# Patient Record
Sex: MALE | URBAN - METROPOLITAN AREA
[De-identification: names, ages, dates, MRNs, and addresses within clinical notes are randomized per-mention and may not be internally consistent; named-entity substitution may affect disease eponyms.]

---

## 2022-01-20 ENCOUNTER — TELEPHONE ENCOUNTER (OUTPATIENT)
Dept: URBAN - METROPOLITAN AREA CLINIC 92 | Facility: CLINIC | Age: 52
End: 2022-01-20

## 2022-01-20 ENCOUNTER — LAB OUTSIDE AN ENCOUNTER (OUTPATIENT)
Dept: URBAN - METROPOLITAN AREA CLINIC 92 | Facility: CLINIC | Age: 52
End: 2022-01-20

## 2022-01-20 ENCOUNTER — OUT OF OFFICE VISIT (OUTPATIENT)
Dept: URBAN - METROPOLITAN AREA MEDICAL CENTER 9 | Facility: MEDICAL CENTER | Age: 52
End: 2022-01-20
Payer: COMMERCIAL

## 2022-01-20 DIAGNOSIS — K85.80 OTHER ACUTE PANCREATITIS WITHOUT INFECTION OR NECROSIS: ICD-10-CM

## 2022-01-20 DIAGNOSIS — R11.0 AM NAUSEA: ICD-10-CM

## 2022-01-20 DIAGNOSIS — R10.84 ABDOMINAL CRAMPING, GENERALIZED: ICD-10-CM

## 2022-01-20 PROCEDURE — G8427 DOCREV CUR MEDS BY ELIG CLIN: HCPCS | Performed by: INTERNAL MEDICINE

## 2022-01-20 PROCEDURE — 99222 1ST HOSP IP/OBS MODERATE 55: CPT | Performed by: INTERNAL MEDICINE

## 2022-01-21 ENCOUNTER — TELEPHONE ENCOUNTER (OUTPATIENT)
Dept: URBAN - METROPOLITAN AREA CLINIC 74 | Facility: CLINIC | Age: 52
End: 2022-01-21

## 2022-01-21 ENCOUNTER — OUT OF OFFICE VISIT (OUTPATIENT)
Dept: URBAN - METROPOLITAN AREA MEDICAL CENTER 9 | Facility: MEDICAL CENTER | Age: 52
End: 2022-01-21
Payer: COMMERCIAL

## 2022-01-21 DIAGNOSIS — K85.80 AUTOIMMUNE PANCREATITIS: ICD-10-CM

## 2022-01-21 DIAGNOSIS — R10.84 ABDOMINAL CRAMPING, GENERALIZED: ICD-10-CM

## 2022-01-21 DIAGNOSIS — R11.0 AM NAUSEA: ICD-10-CM

## 2022-01-21 DIAGNOSIS — R93.2 ABN FIND-BILIARY TRACT: ICD-10-CM

## 2022-01-21 PROBLEM — 253017000: Status: ACTIVE | Noted: 2022-01-20

## 2022-01-21 PROCEDURE — 99232 SBSQ HOSP IP/OBS MODERATE 35: CPT | Performed by: INTERNAL MEDICINE

## 2022-01-26 ENCOUNTER — OFFICE VISIT (OUTPATIENT)
Dept: URBAN - METROPOLITAN AREA MEDICAL CENTER 30 | Facility: MEDICAL CENTER | Age: 52
End: 2022-01-26
Payer: COMMERCIAL

## 2022-01-26 DIAGNOSIS — K83.1 BILIARY STRICTURE: ICD-10-CM

## 2022-01-26 DIAGNOSIS — K80.50 BILE DUCT CALCULUS: ICD-10-CM

## 2022-01-26 PROCEDURE — 43264 ERCP REMOVE DUCT CALCULI: CPT | Performed by: STUDENT IN AN ORGANIZED HEALTH CARE EDUCATION/TRAINING PROGRAM

## 2022-01-26 PROCEDURE — 43274 ERCP DUCT STENT PLACEMENT: CPT | Performed by: STUDENT IN AN ORGANIZED HEALTH CARE EDUCATION/TRAINING PROGRAM

## 2022-01-27 ENCOUNTER — TELEPHONE ENCOUNTER (OUTPATIENT)
Dept: URBAN - METROPOLITAN AREA CLINIC 74 | Facility: CLINIC | Age: 52
End: 2022-01-27

## 2022-01-27 ENCOUNTER — LAB OUTSIDE AN ENCOUNTER (OUTPATIENT)
Dept: URBAN - METROPOLITAN AREA MEDICAL CENTER 30 | Facility: MEDICAL CENTER | Age: 52
End: 2022-01-27

## 2022-01-27 PROBLEM — 235921003: Status: ACTIVE | Noted: 2022-01-27

## 2022-01-31 ENCOUNTER — TELEPHONE ENCOUNTER (OUTPATIENT)
Dept: URBAN - METROPOLITAN AREA CLINIC 74 | Facility: CLINIC | Age: 52
End: 2022-01-31

## 2022-02-03 ENCOUNTER — TELEPHONE ENCOUNTER (OUTPATIENT)
Dept: URBAN - METROPOLITAN AREA CLINIC 74 | Facility: CLINIC | Age: 52
End: 2022-02-03

## 2022-02-03 ENCOUNTER — TELEPHONE ENCOUNTER (OUTPATIENT)
Dept: URBAN - METROPOLITAN AREA CLINIC 40 | Facility: CLINIC | Age: 52
End: 2022-02-03

## 2022-02-06 LAB
A/G RATIO: 1.3
ALBUMIN: 4.1
ALKALINE PHOSPHATASE: 235
AST (SGOT): 53
BASO (ABSOLUTE): 0
BASOS: 0
BILIRUBIN, TOTAL: 1.1
BUN/CREATININE RATIO: 14
BUN: 28
CALCIUM: 9.8
CARBON DIOXIDE, TOTAL: 21
CHLORIDE: 99
CREATININE: 1.98
EGFR IF AFRICN AM: 43
EGFR IF NONAFRICN AM: 37
EOS (ABSOLUTE): 0.2
EOS: 2
GLOBULIN, TOTAL: 3.1
GLUCOSE: 125
HEMATOCRIT: 38.7
HEMATOLOGY COMMENTS:: (no result)
HEMOGLOBIN: 14.1
IMMATURE CELLS: (no result)
IMMATURE GRANS (ABS): 0
IMMATURE GRANULOCYTES: 0
LYMPHS (ABSOLUTE): 1.5
LYMPHS: 14
MCH: 31.6
MCHC: 36.4
MCV: 87
MONOCYTES(ABSOLUTE): 1.3
MONOCYTES: 12
NEUTROPHILS (ABSOLUTE): 7.9
NEUTROPHILS: 72
NRBC: (no result)
PLATELETS: 243
POTASSIUM: 3.9
PROTEIN, TOTAL: 7.2
RBC: 4.46
RDW: 12.8
SODIUM: 142
WBC: 11

## 2022-02-07 ENCOUNTER — TELEPHONE ENCOUNTER (OUTPATIENT)
Dept: URBAN - METROPOLITAN AREA CLINIC 92 | Facility: CLINIC | Age: 52
End: 2022-02-07

## 2022-02-07 PROBLEM — 111583006: Status: ACTIVE | Noted: 2022-02-07

## 2022-02-07 RX ORDER — CIPROFLOXACIN HYDROCHLORIDE 500 MG/1
1 TABLET TABLET, FILM COATED ORAL
Qty: 14 TABLET | Refills: 0 | OUTPATIENT
Start: 2022-02-07 | End: 2022-02-14

## 2022-02-07 RX ORDER — METRONIDAZOLE 500 MG/1
1 TABLET TABLET ORAL THREE TIMES A DAY
Qty: 21 TABLET | Refills: 0 | OUTPATIENT
Start: 2022-02-07 | End: 2022-02-14

## 2022-02-11 ENCOUNTER — OFFICE VISIT (OUTPATIENT)
Dept: URBAN - METROPOLITAN AREA MEDICAL CENTER 18 | Facility: MEDICAL CENTER | Age: 52
End: 2022-02-11
Payer: COMMERCIAL

## 2022-02-11 DIAGNOSIS — Z46.59 ENCOUNTER FOR FITTING AND ADJUSTMENT OF OTHER GASTROINTESTINAL APPLIANCE AND DEVICE: ICD-10-CM

## 2022-02-11 DIAGNOSIS — K83.8 ACQUIRED DILATION OF BILE DUCT: ICD-10-CM

## 2022-02-11 DIAGNOSIS — K80.51 CALCULUS OF BILE DUCT WITHOUT CHOLANGITIS OR CHOLECYSTITIS WITH OBSTRUCTION: ICD-10-CM

## 2022-02-11 PROCEDURE — 43261 ENDO CHOLANGIOPANCREATOGRAPH: CPT | Performed by: STUDENT IN AN ORGANIZED HEALTH CARE EDUCATION/TRAINING PROGRAM

## 2022-02-11 PROCEDURE — 43275 ERCP REMOVE FORGN BODY DUCT: CPT | Performed by: STUDENT IN AN ORGANIZED HEALTH CARE EDUCATION/TRAINING PROGRAM

## 2022-02-11 PROCEDURE — 43264 ERCP REMOVE DUCT CALCULI: CPT | Performed by: STUDENT IN AN ORGANIZED HEALTH CARE EDUCATION/TRAINING PROGRAM

## 2022-02-11 PROCEDURE — 43273 ENDOSCOPIC PANCREATOSCOPY: CPT | Performed by: STUDENT IN AN ORGANIZED HEALTH CARE EDUCATION/TRAINING PROGRAM

## 2022-02-14 ENCOUNTER — LAB OUTSIDE AN ENCOUNTER (OUTPATIENT)
Dept: URBAN - METROPOLITAN AREA CLINIC 92 | Facility: CLINIC | Age: 52
End: 2022-02-14

## 2022-02-14 ENCOUNTER — TELEPHONE ENCOUNTER (OUTPATIENT)
Dept: URBAN - METROPOLITAN AREA CLINIC 92 | Facility: CLINIC | Age: 52
End: 2022-02-14

## 2022-03-25 ENCOUNTER — OFFICE VISIT (OUTPATIENT)
Dept: URBAN - METROPOLITAN AREA MEDICAL CENTER 18 | Facility: MEDICAL CENTER | Age: 52
End: 2022-03-25

## 2022-03-28 ENCOUNTER — LAB OUTSIDE AN ENCOUNTER (OUTPATIENT)
Dept: URBAN - METROPOLITAN AREA CLINIC 74 | Facility: CLINIC | Age: 52
End: 2022-03-28

## 2022-03-28 ENCOUNTER — DASHBOARD ENCOUNTERS (OUTPATIENT)
Age: 52
End: 2022-03-28

## 2022-03-28 ENCOUNTER — OFFICE VISIT (OUTPATIENT)
Dept: URBAN - METROPOLITAN AREA CLINIC 74 | Facility: CLINIC | Age: 52
End: 2022-03-28
Payer: COMMERCIAL

## 2022-03-28 DIAGNOSIS — K80.50 CHOLEDOCHOLITHIASIS: ICD-10-CM

## 2022-03-28 PROCEDURE — 99203 OFFICE O/P NEW LOW 30 MIN: CPT | Performed by: STUDENT IN AN ORGANIZED HEALTH CARE EDUCATION/TRAINING PROGRAM

## 2022-03-28 NOTE — HPI-TODAY'S VISIT:
51-year-old male Seen in the clinic for the first time.  Initially seen in the hospital by my partner for acute biliary pancreatitis.  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  Patient bilirubin initially was high but then trended down.  Patient underwent first ERCP with stent placement across the stricture.  Patient was then plan for repeat ERCP with spyglass at [facility] where pre-existing plastic stent was removed and a spyglass examination was performed which revealed multiple stones in the form of biliary cast from hilum to left hepatic duct and to the origin of branches from left hepatic duct.  EHL lithotripsy was performed and duct was cleared from common hepatic duct, hilum, mid and distal left hepatic duct.  Significant stone burden still remained in the proximal left hepatic duct with poor visualization of biliary tree above it and inability to get a wire across it.  Patient bilirubin was normal even before the first stent placement and hence this time patient was left without stent.  Patient did well post procedure and today comes in for follow-up. Patient intermittently has some abdomen discomfort but not very bothersome.  No nausea or vomiting.  No reflux or dysphagia.  No jaundice.  No fever or chills.  Good appetite.  Denies any weight loss.  Does reports muscular abdomen pain whenever he tries to lift heavy weight which he has currently held off.  No obvious hernia reported as per patient.

## 2022-04-08 ENCOUNTER — OFFICE VISIT (OUTPATIENT)
Dept: URBAN - METROPOLITAN AREA MEDICAL CENTER 18 | Facility: MEDICAL CENTER | Age: 52
End: 2022-04-08
Payer: COMMERCIAL

## 2022-04-08 DIAGNOSIS — K83.8 ACQUIRED DILATION OF BILE DUCT: ICD-10-CM

## 2022-04-08 DIAGNOSIS — K80.50 BILE DUCT CALCULUS: ICD-10-CM

## 2022-04-08 PROCEDURE — 43265 ERCP LITHOTRIPSY CALCULI: CPT | Performed by: STUDENT IN AN ORGANIZED HEALTH CARE EDUCATION/TRAINING PROGRAM

## 2022-04-08 PROCEDURE — 74328 X-RAY BILE DUCT ENDOSCOPY: CPT | Performed by: STUDENT IN AN ORGANIZED HEALTH CARE EDUCATION/TRAINING PROGRAM

## 2022-04-08 PROCEDURE — 43273 ENDOSCOPIC PANCREATOSCOPY: CPT | Performed by: STUDENT IN AN ORGANIZED HEALTH CARE EDUCATION/TRAINING PROGRAM

## 2022-04-11 ENCOUNTER — TELEPHONE ENCOUNTER (OUTPATIENT)
Dept: URBAN - METROPOLITAN AREA CLINIC 92 | Facility: CLINIC | Age: 52
End: 2022-04-11